# Patient Record
Sex: MALE | Race: WHITE | NOT HISPANIC OR LATINO | Employment: FULL TIME | URBAN - METROPOLITAN AREA
[De-identification: names, ages, dates, MRNs, and addresses within clinical notes are randomized per-mention and may not be internally consistent; named-entity substitution may affect disease eponyms.]

---

## 2018-02-09 ENCOUNTER — ANESTHESIA EVENT (OUTPATIENT)
Dept: PERIOP | Facility: AMBULARY SURGERY CENTER | Age: 63
End: 2018-02-09
Payer: COMMERCIAL

## 2018-02-14 NOTE — PRE-PROCEDURE INSTRUCTIONS
No outpatient prescriptions have been marked as taking for the 2/23/18 encounter [facility] Encounter)  Pre colonoscopy instructions reviewed

## 2018-02-23 ENCOUNTER — ANESTHESIA (OUTPATIENT)
Dept: PERIOP | Facility: AMBULARY SURGERY CENTER | Age: 63
End: 2018-02-23
Payer: COMMERCIAL

## 2018-02-23 ENCOUNTER — HOSPITAL ENCOUNTER (OUTPATIENT)
Facility: AMBULARY SURGERY CENTER | Age: 63
Setting detail: OUTPATIENT SURGERY
Discharge: HOME | End: 2018-02-23
Attending: COLON & RECTAL SURGERY | Admitting: COLON & RECTAL SURGERY
Payer: COMMERCIAL

## 2018-02-23 VITALS
OXYGEN SATURATION: 99 % | WEIGHT: 172 LBS | SYSTOLIC BLOOD PRESSURE: 130 MMHG | HEIGHT: 71 IN | RESPIRATION RATE: 18 BRPM | DIASTOLIC BLOOD PRESSURE: 81 MMHG | TEMPERATURE: 97.9 F | HEART RATE: 57 BPM | BODY MASS INDEX: 24.08 KG/M2

## 2018-02-23 RX ORDER — SODIUM CHLORIDE 9 MG/ML
125 INJECTION, SOLUTION INTRAVENOUS CONTINUOUS
Status: DISCONTINUED | OUTPATIENT
Start: 2018-02-23 | End: 2018-02-23 | Stop reason: HOSPADM

## 2018-02-23 RX ORDER — LIDOCAINE HYDROCHLORIDE 10 MG/ML
INJECTION, SOLUTION EPIDURAL; INFILTRATION; INTRACAUDAL; PERINEURAL AS NEEDED
Status: DISCONTINUED | OUTPATIENT
Start: 2018-02-23 | End: 2018-02-23 | Stop reason: SURG

## 2018-02-23 RX ORDER — PROPOFOL 10 MG/ML
INJECTION, EMULSION INTRAVENOUS AS NEEDED
Status: DISCONTINUED | OUTPATIENT
Start: 2018-02-23 | End: 2018-02-23 | Stop reason: SURG

## 2018-02-23 RX ADMIN — LIDOCAINE HYDROCHLORIDE 50 MG: 10 INJECTION, SOLUTION EPIDURAL; INFILTRATION; INTRACAUDAL; PERINEURAL at 08:41

## 2018-02-23 RX ADMIN — PROPOFOL 100 MG: 10 INJECTION, EMULSION INTRAVENOUS at 08:41

## 2018-02-23 RX ADMIN — PROPOFOL 20 MG: 10 INJECTION, EMULSION INTRAVENOUS at 08:51

## 2018-02-23 RX ADMIN — PROPOFOL 20 MG: 10 INJECTION, EMULSION INTRAVENOUS at 08:46

## 2018-02-23 RX ADMIN — SODIUM CHLORIDE 125 ML/HR: 0.9 INJECTION, SOLUTION INTRAVENOUS at 08:18

## 2018-02-23 RX ADMIN — PROPOFOL 40 MG: 10 INJECTION, EMULSION INTRAVENOUS at 08:42

## 2018-02-23 RX ADMIN — PROPOFOL 20 MG: 10 INJECTION, EMULSION INTRAVENOUS at 08:50

## 2018-02-23 RX ADMIN — PROPOFOL 20 MG: 10 INJECTION, EMULSION INTRAVENOUS at 08:48

## 2018-02-23 RX ADMIN — PROPOFOL 20 MG: 10 INJECTION, EMULSION INTRAVENOUS at 08:45

## 2018-02-23 NOTE — H&P
History and Physical   Colon and Rectal Surgery   Schuyler Nieto 63 y o  male MRN: [id number]  Unit/Bed#: OR [place] Encounter: [id number]  02/23/18   8:32 AM      No chief complaint on file  History of Present Illness   HPI:  Andres Ng is a 63 y o  male who presents with screening colonoscopy  Historical Information   Past Medical History:   Diagnosis Date    Prostate enlargement      Past Surgical History:   Procedure Laterality Date    COLONOSCOPY      HYDROCELE EXCISION / REPAIR  1996       Meds/Allergies     No prescriptions prior to admission  Current Facility-Administered Medications:     sodium chloride 0 9 % infusion, 125 mL/hr, Intravenous, Continuous, Ceci Bear MD, Last Rate: 125 mL/hr at 02/23/18 0818, 125 mL/hr at 02/23/18 0818    No Known Allergies      Social History   History   Alcohol Use    Yes     Comment: weekly     History   Drug Use No     History   Smoking Status    Never Smoker   Smokeless Tobacco    Never Used         Family History: History reviewed  No pertinent family history  Objective     Current Vitals:   Blood Pressure: 135/83 (02/23/18 0759)  Pulse: 80 (02/23/18 0759)  Temperature: 97 7 °F (36 5 °C) (02/23/18 0759)  Temp Source: Temporal (02/23/18 0759)  Respirations: 18 (02/23/18 0759)  Height: 5' 11" (180 3 cm) (02/23/18 0759)  Weight - Scale: 78 kg (172 lb) (02/23/18 0759)  SpO2: 98 % (02/23/18 0759)  No intake or output data in the 24 hours ending 02/23/18 7627    Physical Exam:  General: No acute distress  Eyes: Normal   ENT: Normal   Neck: No JVD  Pulm: Normal in A&P  CV: NSR no murmur  Abdomen: Soft and normal on palpation, no mass, no tenderness, no guarding  Rectal: Normal sphincter tone, perianal large acanthotic squamous lesion noted  Extremities: Normal  Lymphatics: Normal        Lab Results: I have personally reviewed pertinent lab results  Imaging: I have personally reviewed pertinent reports      Patient was consented by myself for procedure as explained earlier with all the risks and benefits described  All questions answered  ASSESSMENT:  Del Long is a 63 y o  male who presents with screening colonoscopy  Christiane Valdivia PLAN:  screening colonoscopy

## 2018-02-23 NOTE — ANESTHESIA POSTPROCEDURE EVALUATION
Post-Op Assessment Note      CV Status:  Stable    Mental Status:  Alert and awake    Hydration Status:  Euvolemic    PONV Controlled:  Controlled    Airway Patency:  Patent    Post Op Vitals Reviewed: Yes          Staff: Anesthesiologist           /76 (02/23/18 0858)    Temp 97 9 °F (36 6 °C) (02/23/18 0858)    Pulse 65 (02/23/18 0858)   Resp 18 (02/23/18 0858)    SpO2 96 % (02/23/18 0858)

## 2018-02-23 NOTE — OP NOTE
OPERATIVE REPORT  PATIENT NAME: Rayne Severe    :  1955  MRN: [id number]  Pt Location: [facility]  GI ROOM 01    SURGERY DATE: 2018    Surgeon(s) and Role:     * Narciso Gregg MD - Primary    Preop Diagnosis:  Encounter for screening for malignant neoplasm of colon [Z12 11]    Post-Op Diagnosis Codes:     * Encounter for screening for malignant neoplasm of colon [Z12 11]    Procedure(s) (LRB):  COLONOSCOPY (N/A)    Specimen(s):  * No specimens in log *    Estimated Blood Loss:   Minimal    Drains:       Anesthesia Type:   IV Sedation with Anesthesia    Operative Indications:  Encounter for screening for malignant neoplasm of colon [Z12 11]      Operative Findings:  Perianal large acanthotic squamous lesion, otherwise normal colonoscopy    Complications:   None    Procedure and Technique:  Colonoscopy Procedure Note    Procedure: Colonoscopy --screening    Pre-operative Diagnosis: screening    Post-operative Diagnosis: normal, perianal lesion    Indications: screening for colon cancer    Sedation: anesth  ASA Class: 2    Procedure Details     Informed consent was obtained for the procedure, including sedation  Risks of perforation, hemorrhage, adverse drug reaction and aspiration were discussed  The patient was placed in the left lateral decubitus position  Based on the pre-procedure assessment, including review of the patient's medical history, medications, allergies, and review of systems, he had been deemed to be an appropriate candidate for conscious sedation; he was therefore sedated with the medications listed below  The patient was monitored continuously with ECG tracing, pulse oximetry, blood pressure monitoring, and direct observations  A rectal examination was performed  The variable-stiffness pediatric colonoscope was inserted into the rectum and advanced under direct vision to the cecum, which was identified by the appendiceal orifice    The quality of the colonic preparation was excellent  A careful inspection was made as the colonoscope was withdrawn, including a retroflexed view of the rectum; findings and interventions are described below  Appropriate photodocumentation was obtained  Findings:  -normal colonic mucosa throughout    Specimens: none           Complications:  None; patient tolerated the procedure well  Disposition: PACU            Condition: stable    Attending Attestation: I was present for the entire procedure    Impression:    -Normal colonoscopy to the cecum, with no evidence of neoplasia, diverticular disease, or mucosal abnormality  ,   -perianal anterior acanthotic squamous lesion    Recommendations:  -colonoscopy in 10 years  -schedule excision of the anal lesion, under local anesthesia with sedation with our office [phone]     I was present for the entire procedure    Patient Disposition:  PACU     SIGNATURE: Laverne Boeck, MD  DATE: February 23, 2018  TIME: 8:55 AM

## 2018-02-23 NOTE — ANESTHESIA PREPROCEDURE EVALUATION
Review of Systems/Medical History  Patient summary reviewed  Chart reviewed      Cardiovascular  Negative cardio ROS Exercise tolerance: good,  Hyperlipidemia,    Pulmonary  Negative pulmonary ROS        GI/Hepatic  Negative GI/hepatic ROS          Negative  ROS        Endo/Other  Negative endo/other ROS      GYN       Hematology  Negative hematology ROS      Musculoskeletal  Negative musculoskeletal ROS        Neurology  Negative neurology ROS      Psychology   Negative psychology ROS              Physical Exam    Airway    Mallampati score: I  TM Distance: >3 FB  Neck ROM: full     Dental   No notable dental hx     Cardiovascular  Comment: Negative ROS, Cardiovascular exam normal    Pulmonary  Pulmonary exam normal     Other Findings        Anesthesia Plan  ASA Score- 2     Anesthesia Type- IV sedation with anesthesia with ASA Monitors  Additional Monitors:   Airway Plan:         Plan Factors-  Patient did not smoke on day of surgery  Induction- intravenous  Postoperative Plan-     Informed Consent- Anesthetic plan and risks discussed with patient

## 2023-01-10 ENCOUNTER — TELEPHONE (OUTPATIENT)
Age: 68
End: 2023-01-10

## 2023-01-10 NOTE — TELEPHONE ENCOUNTER
PT called to schedule recall colonoscopy, last done 2/23/2018 by Dr Alisson Gao  PT's info verified

## 2023-01-25 ENCOUNTER — PREP FOR PROCEDURE (OUTPATIENT)
Age: 68
End: 2023-01-25

## 2023-01-25 DIAGNOSIS — Z86.010 PERSONAL HISTORY OF COLONIC POLYPS: Primary | ICD-10-CM

## 2023-04-21 ENCOUNTER — ANESTHESIA (OUTPATIENT)
Dept: GASTROENTEROLOGY | Facility: AMBULARY SURGERY CENTER | Age: 68
End: 2023-04-21

## 2023-04-21 ENCOUNTER — HOSPITAL ENCOUNTER (OUTPATIENT)
Dept: GASTROENTEROLOGY | Facility: AMBULARY SURGERY CENTER | Age: 68
Setting detail: OUTPATIENT SURGERY
Discharge: HOME | End: 2023-04-21
Attending: COLON & RECTAL SURGERY

## 2023-04-21 ENCOUNTER — ANESTHESIA EVENT (OUTPATIENT)
Dept: GASTROENTEROLOGY | Facility: AMBULARY SURGERY CENTER | Age: 68
End: 2023-04-21

## 2023-04-21 VITALS
TEMPERATURE: 97.8 F | DIASTOLIC BLOOD PRESSURE: 69 MMHG | SYSTOLIC BLOOD PRESSURE: 115 MMHG | OXYGEN SATURATION: 98 % | WEIGHT: 161 LBS | HEART RATE: 64 BPM | BODY MASS INDEX: 22.54 KG/M2 | HEIGHT: 71 IN | RESPIRATION RATE: 18 BRPM

## 2023-04-21 DIAGNOSIS — Z86.010 PERSONAL HISTORY OF COLONIC POLYPS: ICD-10-CM

## 2023-04-21 RX ORDER — PROPOFOL 10 MG/ML
INJECTION, EMULSION INTRAVENOUS AS NEEDED
Status: DISCONTINUED | OUTPATIENT
Start: 2023-04-21 | End: 2023-04-21

## 2023-04-21 RX ORDER — ROSUVASTATIN CALCIUM 5 MG/1
TABLET, COATED ORAL
COMMUNITY
Start: 2023-04-04

## 2023-04-21 RX ORDER — SODIUM CHLORIDE, SODIUM LACTATE, POTASSIUM CHLORIDE, CALCIUM CHLORIDE 600; 310; 30; 20 MG/100ML; MG/100ML; MG/100ML; MG/100ML
INJECTION, SOLUTION INTRAVENOUS CONTINUOUS PRN
Status: DISCONTINUED | OUTPATIENT
Start: 2023-04-21 | End: 2023-04-21

## 2023-04-21 RX ORDER — PROPOFOL 10 MG/ML
INJECTION, EMULSION INTRAVENOUS CONTINUOUS PRN
Status: DISCONTINUED | OUTPATIENT
Start: 2023-04-21 | End: 2023-04-21

## 2023-04-21 RX ADMIN — PROPOFOL 100 MG: 10 INJECTION, EMULSION INTRAVENOUS at 07:42

## 2023-04-21 RX ADMIN — SODIUM CHLORIDE, SODIUM LACTATE, POTASSIUM CHLORIDE, AND CALCIUM CHLORIDE: .6; .31; .03; .02 INJECTION, SOLUTION INTRAVENOUS at 07:37

## 2023-04-21 RX ADMIN — PROPOFOL 100 MCG/KG/MIN: 10 INJECTION, EMULSION INTRAVENOUS at 07:42

## 2023-04-21 NOTE — H&P
"History and Physical   Colon and Rectal Surgery   Moshe Nieto 74 y o  male MRN: [id number]  Unit/Bed#:  Encounter: [id number]  04/21/23   7:36 AM      CC: Screening of the colon  History of Present Illness   HPI:  Pablo Mary is a 74 y o  male for baseline screen  He had a perianal squamous lesion seen 5 years ago that was not treated  Historical Information   Past Medical History:   Diagnosis Date   • Hyperlipidemia    • Prostate enlargement      Past Surgical History:   Procedure Laterality Date   • COLONOSCOPY     • HYDROCELE EXCISION / REPAIR  1996   • ME COLONOSCOPY FLX DX W/COLLJ SPEC WHEN PFRMD N/A 2/23/2018    Procedure: COLONOSCOPY;  Surgeon: Dunia Caceres MD;  Location: [facility]  GI LAB; Service: Colorectal       Meds/Allergies     (Not in a hospital admission)        Current Outpatient Medications:   •  rosuvastatin (CRESTOR) 5 mg tablet, , Disp: , Rfl:     No Known Allergies      Social History   Social History     Substance and Sexual Activity   Alcohol Use Yes    Comment: weekly     Social History     Substance and Sexual Activity   Drug Use No     Social History     Tobacco Use   Smoking Status Never   Smokeless Tobacco Never         Family History: History reviewed  No pertinent family history  Objective     Current Vitals:   Blood Pressure: 141/87 (04/21/23 0705)  Pulse: 74 (04/21/23 0705)  Temperature: (!) 97 1 °F (36 2 °C) (04/21/23 0705)  Temp Source: Temporal (04/21/23 0705)  Respirations: 12 (04/21/23 0705)  Height: 5' 11\" (180 3 cm) (04/21/23 0705)  Weight - Scale: 73 kg (161 lb) (04/21/23 0705)  SpO2: 98 % (04/21/23 0705)  No intake or output data in the 24 hours ending 04/21/23 0736    Physical Exam:  General:  Well nourished, no distress  Neuro: Alert and oriented  Eyes:Sclera anicteric, conjunctiva pink  Pulm: Clear to auscultation bilaterally  No respiratory Distress  CV:  Regular rate and rhythm  No murmurs    Abdomen:  Soft, flat, non-tender, without masses " or hepatosplenomegaly  Lab Results:       ASSESSMENT:  Jf Mendes is a 74 y o  male for screening  PLAN:  Colonoscopy  Risks , including, but not limited to, bleeding, perforation, missed lesions, and potential need for surgery, were reviewed  Alternatives to colonoscopy were discussed  Will check the anal area for any lesions    Daniel Mcdonald MD

## 2023-04-21 NOTE — ANESTHESIA PREPROCEDURE EVALUATION
Procedure:  COLONOSCOPY    Relevant Problems   No relevant active problems        Physical Exam    Airway    Mallampati score: I  TM Distance: >3 FB  Neck ROM: full     Dental   No notable dental hx     Cardiovascular      Pulmonary      Other Findings        Anesthesia Plan  ASA Score- 1     Anesthesia Type- IV sedation with anesthesia with ASA Monitors  Additional Monitors:   Airway Plan:           Plan Factors-Exercise tolerance (METS): >4 METS  Chart reviewed  Existing labs reviewed  Patient summary reviewed  Induction- intravenous  Postoperative Plan-     Informed Consent- Anesthetic plan and risks discussed with patient  I personally reviewed this patient with the CRNA  Discussed and agreed on the Anesthesia Plan with the CRNA  Katelyn Turner

## 2023-04-21 NOTE — ANESTHESIA POSTPROCEDURE EVALUATION
Post-Op Assessment Note    CV Status:  Stable  Pain Score: 0    Pain management: adequate     Mental Status:  Alert and awake   Hydration Status:  Stable   PONV Controlled:  None   Airway Patency:  Patent      Post Op Vitals Reviewed: Yes      Staff: CRNA         No notable events documented      /58 (04/21/23 0804)    Temp 97 8 °F (36 6 °C) (04/21/23 0804)    Pulse 68 (04/21/23 0804)   Resp 16 (04/21/23 0804)    SpO2 97 % (04/21/23 0804)